# Patient Record
Sex: MALE | Race: WHITE | Employment: FULL TIME | URBAN - NONMETROPOLITAN AREA
[De-identification: names, ages, dates, MRNs, and addresses within clinical notes are randomized per-mention and may not be internally consistent; named-entity substitution may affect disease eponyms.]

---

## 2017-09-24 ENCOUNTER — HOSPITAL ENCOUNTER (EMERGENCY)
Facility: HOSPITAL | Age: 57
Discharge: HOME | End: 2017-09-24
Attending: NURSE PRACTITIONER | Admitting: NURSE PRACTITIONER
Payer: COMMERCIAL

## 2017-09-24 VITALS
HEART RATE: 89 BPM | DIASTOLIC BLOOD PRESSURE: 83 MMHG | RESPIRATION RATE: 18 BRPM | TEMPERATURE: 98.9 F | OXYGEN SATURATION: 97 % | SYSTOLIC BLOOD PRESSURE: 135 MMHG

## 2017-09-24 DIAGNOSIS — M79.671 RIGHT FOOT PAIN: ICD-10-CM

## 2017-09-24 PROCEDURE — 99213 OFFICE O/P EST LOW 20 MIN: CPT

## 2017-09-24 PROCEDURE — 99202 OFFICE O/P NEW SF 15 MIN: CPT | Performed by: NURSE PRACTITIONER

## 2017-09-24 PROCEDURE — 73630 X-RAY EXAM OF FOOT: CPT | Mod: TC,RT

## 2017-09-24 ASSESSMENT — ENCOUNTER SYMPTOMS
DIARRHEA: 0
VOMITING: 0
HEMATOLOGIC/LYMPHATIC NEGATIVE: 1
ARTHRALGIAS: 1
GASTROINTESTINAL NEGATIVE: 1
CARDIOVASCULAR NEGATIVE: 1
FEVER: 0
NAUSEA: 0

## 2017-09-24 NOTE — ED PROVIDER NOTES
History     Chief Complaint   Patient presents with     Ankle Pain     stepped wrong earlier today and injured outter right foot     The history is provided by the patient. No  was used.     Xavier Mcclain is a 57 year old male who presents with right foot pain after stepping wrong and twisting earlier today.     I have reviewed the Medications, Allergies, Past Medical and Surgical History, and Social History in the Epic system.        Review of Systems   Constitutional: Negative for fever.   HENT: Negative.    Cardiovascular: Negative.    Gastrointestinal: Negative.  Negative for diarrhea, nausea and vomiting.   Genitourinary: Negative.    Musculoskeletal: Positive for arthralgias (right foot pain).   Skin: Negative.    Hematological: Negative.        Physical Exam   BP: 135/83  Pulse: 89  Temp: 98.9  F (37.2  C)  Resp: 18  SpO2: 97 %  Physical Exam   Constitutional: He is oriented to person, place, and time. He appears well-developed and well-nourished.   HENT:   Head: Normocephalic and atraumatic.   Eyes: Conjunctivae are normal. Right eye exhibits no discharge. Left eye exhibits no discharge.   Neck: Normal range of motion.   Cardiovascular: Normal rate.    Pulmonary/Chest: Effort normal.   Musculoskeletal: He exhibits tenderness (TTP over the mid fourth metatarsal.  ).   Strength to right foot is 5/5, ROM to foot, ankle and toes is full  Distal pulses are intact, cfp is brisk   Neurological: He is alert and oriented to person, place, and time.   Skin: Skin is warm and dry. No erythema.   Nursing note and vitals reviewed.      ED Course     ED Course     Procedures             Labs Ordered and Resulted from Time of ED Arrival Up to the Time of Departure from the ED - No data to display    Assessments & Plan (with Medical Decision Making)     I have reviewed the nursing notes.   xray is obtained and reviewed , there are no acute bony abnormalities seen./ rads reading  pending.    Pathophysiology, possible etiology and treatment with potential outcomes, risks, benefits, and alternatives discussed to the best of my ability      Would advise rest, ice , compression and elevation. Nsaid's, ibuprofen 800 mg every 8 hours with food, stop any GI upset and follow up in the office for worsening symptoms or no slow resolution.  Pt verbalizes understanding and agreement with plan.    I have reviewed the findings, diagnosis, plan and need for follow up with the patient.      Discharge Medication List as of 9/24/2017  5:21 PM          Final diagnoses:   Right foot pain       9/24/2017   [facility]     Dionne Sawant, GERONIMO  09/24/17 5120

## 2017-09-24 NOTE — ED NOTES
C/o stepping wrong earlier today and hearing a pop in his outer right foot. C/o increased pain now.

## 2017-09-24 NOTE — ED AVS SNAPSHOT
[facility]    [address] [address]    Phone:  [phone]                                       Xavier Mcclain   MRN: [id number]    Department:  [facility]   Date of Visit:  9/24/2017           Patient Information     Date Of Birth          1960        Your diagnoses for this visit were:     Right foot pain        You were seen by Dionne Sawant NP.      Follow-up Information     Follow up with Montrell Taylor DO.    Specialty:  Family Practice    Why:  As needed, If symptoms worsen    Contact information:    Critical access hospital  [address]  [facility] [address] [phone]          Follow up with [facility].    Specialty:  EMERGENCY MEDICINE    Why:  As needed, If symptoms worsen    Contact information:    [address] [address] [phone]    Additional information:    From [facility]: Take [address]. Turn left at [address]. Turn left at the first stoplight on [address]. At the first stop sign, take a right onto [address]. Take a left into the parking lot and continue through until you reach the North enterance of the building.       From [place]: Take [address]. Take the [address] ramp towards [place]. Turn left onto [address]. Take a slight right onto [address]. Turn left at the first stoplight on [address]. At the first stop sign, take a right onto [address]. Take a left into the parking lot and continue through until you reach the North enterance of the building.       From [place]: Take [address]. Take a right at [address]. At the first stop sign, take a right onto [address]. Take a left into the parking lot and continue through until you reach the North enterance of the building.       Discharge References/Attachments     STRAINS AND SPRAINS, SELF-CARE FOR (ENGLISH)         Review of your medicines  "     Our records show that you are taking the medicines listed below. If these are incorrect, please call your family doctor or clinic.        Dose / Directions Last dose taken    ibuprofen 200 MG capsule   Dose:  200 mg        Take 200 mg by mouth every 4 hours as needed   Refills:  0        LIPITOR PO   Dose:  20 mg        Take 20 mg by mouth   Refills:  0        ZYRTEC ALLERGY 10 MG tablet   Dose:  10 mg   Generic drug:  cetirizine        Take 10 mg by mouth daily as needed   Refills:  0                Orders Needing Specimen Collection     None      Pending Results     No orders found from 2017 to 2017.            Pending Culture Results     No orders found from 2017 to 2017.            Thank you for choosing [facility]       Thank you for choosing [facility] for your care. Our goal is always to provide you with excellent care. Hearing back from our patients is one way we can continue to improve our services. Please take a few minutes to complete the written survey that you may receive in the mail after you visit with us. Thank you!        [facility] Information     [employer] lets you send messages to your doctor, view your test results, renew your prescriptions, schedule appointments and more. To sign up, go to [email or web address] . Click on \"Log in\" on the left side of the screen, which will take you to the Welcome page. Then click on \"Sign up Now\" on the right side of the page.     You will be asked to enter the access code listed below, as well as some personal information. Please follow the directions to create your username and password.     Your access code is: [email or web address]  Expires: 2017  5:21 PM     Your access code will  in 90 days. If you need help or a new code, please call your [facility] clinic or [phone].        Care EveryWhere ID     This is your Care EveryWhere ID. This could be used by other organizations to access your [facility] medical records  [id number]      "   Equal Access to Services     ARMOND GARZA : Jc Estrada, lisa huff, johnny maguire. [facility] [phone].    ATENCIÓN: Si habla español, tiene a penn disposición servicios gratuitos de asistencia lingüística. Llame al [phone].    We comply with applicable federal civil rights laws and [place] laws. We do not discriminate on the basis of race, color, national origin, age, disability sex, sexual orientation or gender identity.            After Visit Summary       This is your record. Keep this with you and show to your community pharmacist(s) and doctor(s) at your next visit.

## 2017-09-24 NOTE — ED AVS SNAPSHOT
[facility]    [address] [address]    Phone:  [phone]                                       Xavier Mcclain   MRN: [id number]    Department:  [facility]   Date of Visit:  9/24/2017           After Visit Summary Signature Page     I have received my discharge instructions, and my questions have been answered. I have discussed any challenges I see with this plan with the nurse or doctor.    ..........................................................................................................................................  Patient/Patient Representative Signature      ..........................................................................................................................................  Patient Representative Print Name and Relationship to Patient    ..................................................               ................................................  Date                                            Time    ..........................................................................................................................................  Reviewed by Signature/Title    ...................................................              ..............................................  Date                                                            Time

## 2025-06-29 ENCOUNTER — APPOINTMENT (OUTPATIENT)
Dept: GENERAL RADIOLOGY | Facility: HOSPITAL | Age: 65
End: 2025-06-29
Payer: COMMERCIAL

## 2025-06-29 ENCOUNTER — HOSPITAL ENCOUNTER (EMERGENCY)
Facility: HOSPITAL | Age: 65
Discharge: HOME | End: 2025-06-29
Payer: COMMERCIAL

## 2025-06-29 VITALS
OXYGEN SATURATION: 95 % | RESPIRATION RATE: 18 BRPM | TEMPERATURE: 97.2 F | HEART RATE: 72 BPM | DIASTOLIC BLOOD PRESSURE: 81 MMHG | SYSTOLIC BLOOD PRESSURE: 135 MMHG

## 2025-06-29 DIAGNOSIS — J01.90 ACUTE SINUSITIS WITH SYMPTOMS > 10 DAYS: ICD-10-CM

## 2025-06-29 DIAGNOSIS — J20.9 ACUTE BRONCHITIS WITH SYMPTOMS > 10 DAYS: ICD-10-CM

## 2025-06-29 PROCEDURE — 71046 X-RAY EXAM CHEST 2 VIEWS: CPT

## 2025-06-29 PROCEDURE — 99213 OFFICE O/P EST LOW 20 MIN: CPT

## 2025-06-29 PROCEDURE — 71046 X-RAY EXAM CHEST 2 VIEWS: CPT | Mod: 26 | Performed by: RADIOLOGY

## 2025-06-29 PROCEDURE — G0463 HOSPITAL OUTPT CLINIC VISIT: HCPCS | Mod: 25

## 2025-06-29 RX ORDER — PREDNISONE 20 MG/1
TABLET ORAL
Qty: 10 TABLET | Refills: 0 | Status: SHIPPED | OUTPATIENT
Start: 2025-06-29

## 2025-06-29 ASSESSMENT — ENCOUNTER SYMPTOMS
COUGH: 1
NAUSEA: 0
ACTIVITY CHANGE: 0
SORE THROAT: 0
SINUS PRESSURE: 1
CHEST TIGHTNESS: 1
VOMITING: 0
WHEEZING: 0
APPETITE CHANGE: 0
FEVER: 0
DIARRHEA: 0

## 2025-06-29 ASSESSMENT — COLUMBIA-SUICIDE SEVERITY RATING SCALE - C-SSRS
6. HAVE YOU EVER DONE ANYTHING, STARTED TO DO ANYTHING, OR PREPARED TO DO ANYTHING TO END YOUR LIFE?: NO
1. IN THE PAST MONTH, HAVE YOU WISHED YOU WERE DEAD OR WISHED YOU COULD GO TO SLEEP AND NOT WAKE UP?: NO
2. HAVE YOU ACTUALLY HAD ANY THOUGHTS OF KILLING YOURSELF IN THE PAST MONTH?: NO

## 2025-06-29 NOTE — ED PROVIDER NOTES
History     Chief Complaint   Patient presents with    Sinusitis     HPI  Xavier Mcclain is a 64 year old male who presents to the urgent care with complaints of cough and sinus pressure for the last four weeks. He denies sore throat, ear pain, fevers, chest pressure, and shortness of breath. Has had some chest tightness. Smoker. Hx of COPD. Wife has been ill with similar symptoms. No recent abx or OTC medications.     Allergies:  No Known Allergies    Problem List:    There are no active problems to display for this patient.       Past Medical History:    No past medical history on file.    Past Surgical History:    Past Surgical History:   Procedure Laterality Date    COLONOSCOPY  3/20/2014    Procedure: COLONOSCOPY;  COLONOSCOPY WITH BIOPSY, POLYPECTOMY;  Surgeon: Mehrdad Avila MD;  Location: [facility]       Family History:    No family history on file.    Social History:  Marital Status:   [2]  Social History     Tobacco Use    Smoking status: Every Day     Current packs/day: 0.30     Average packs/day: 0.3 packs/day for 35.0 years (10.5 ttl pk-yrs)     Types: Cigarettes    Smokeless tobacco: Never        Medications:    amoxicillin-clavulanate (AUGMENTIN) 875-125 MG tablet  Atorvastatin Calcium (LIPITOR PO)  cetirizine (ZYRTEC ALLERGY) 10 MG tablet  ibuprofen 200 MG capsule  predniSONE (DELTASONE) 20 MG tablet          Review of Systems   Constitutional:  Negative for activity change, appetite change and fever.   HENT:  Positive for congestion and sinus pressure. Negative for ear pain and sore throat.    Respiratory:  Positive for cough and chest tightness. Negative for wheezing.    Cardiovascular:  Negative for chest pain.   Gastrointestinal:  Negative for diarrhea, nausea and vomiting.   All other systems reviewed and are negative.      Physical Exam   BP: 135/81  Pulse: 72  Temp: 97.2  F (36.2  C)  Resp: 18  SpO2: 95 %      Physical Exam  Vitals and nursing note reviewed.   Constitutional:        General: He is not in acute distress.     Appearance: Normal appearance. He is not ill-appearing or toxic-appearing.   HENT:      Right Ear: Tympanic membrane is injected and bulging. Tympanic membrane is not erythematous.      Left Ear: Tympanic membrane is bulging. Tympanic membrane is not erythematous.      Nose: Congestion present.      Mouth/Throat:      Mouth: Mucous membranes are moist.      Pharynx: Oropharynx is clear. No oropharyngeal exudate or posterior oropharyngeal erythema.   Cardiovascular:      Rate and Rhythm: Normal rate and regular rhythm.      Pulses: Normal pulses.      Heart sounds: Normal heart sounds. No murmur heard.  Pulmonary:      Effort: Pulmonary effort is normal.      Breath sounds: Normal breath sounds. No wheezing, rhonchi or rales.   Neurological:      Mental Status: He is alert.         ED Course        Procedures       Recent Results (from the past 24 hours)   Chest XR,  PA & LAT    Narrative    EXAM: XR CHEST 2 VIEWS  LOCATION: [facility]  DATE: 6/29/2025    INDICATION: cough  COMPARISON: CT 1/15/2013      Impression    IMPRESSION: Lungs hyperinflated. Mild right basilar atelectasis/scar. No pleural effusion. The cardiac silhouette and pulmonary vasculature are normal.       Medications - No data to display    Assessments & Plan (with Medical Decision Making)     I have reviewed the nursing notes.    I have reviewed the findings, diagnosis, plan and need for follow up with the patient.  Xavier Mcclain is a 64 year old male who presents to the urgent care with complaints of cough and sinus pressure for the last four weeks. He denies sore throat, ear pain, fevers, chest pressure, and shortness of breath. Has had some chest tightness. Smoker. Hx of COPD. Wife has been ill with similar symptoms. No recent abx or OTC medications.     MDM: vital signs normal, afebrile. Non toxic in appearance with no noted distress. Able to speak in complete sentences without dyspnea.  Lungs clear, heart tones regular. CXR reviewed with no pneumonia. Due to length of symptoms, will treat with augmentin as there is suspicion for bacterial sinusitis. Prednisone burst also prescribed. Supportive measures and strict return precautions discussed. He is in agreement with plan.     (J01.90) Acute sinusitis with symptoms > 10 days, (J20.9) Acute bronchitis with symptoms > 10 days  Plan: Augmentin twice daily for 10 days. Yogurt or probiotic while taking.   Prednisone once daily for 5 days. Take this earlier in the day as it may interrupt sleep. Do not take ibuprofen while taking this medication. You can take tylenol.     Push fluids.   Follow up in the clinic as needed.   Return with any new or concerning symptoms. Understanding verbalized.     Discharge Medication List as of 6/29/2025  9:55 AM        START taking these medications    Details   amoxicillin-clavulanate (AUGMENTIN) 875-125 MG tablet Take 1 tablet by mouth 2 times daily for 10 days., Disp-20 tablet, R-0, E-Prescribe      predniSONE (DELTASONE) 20 MG tablet Take two tablets (= 40mg) each day for 5 (five) days, Disp-10 tablet, R-0, E-Prescribe             Final diagnoses:   Acute sinusitis with symptoms > 10 days   Acute bronchitis with symptoms > 10 days       6/29/2025   [facility]       Giulia Olson NP  06/29/25 1002

## 2025-06-29 NOTE — DISCHARGE INSTRUCTIONS
Augmentin twice daily for 10 days. Yogurt or probiotic while taking.   Prednisone once daily for 5 days. Take this earlier in the day as it may interrupt sleep. Do not take ibuprofen while taking this medication. You can take tylenol.     Push fluids.   Follow up in the clinic as needed.   Return with any new or concerning symptoms.